# Patient Record
Sex: FEMALE | Race: BLACK OR AFRICAN AMERICAN | NOT HISPANIC OR LATINO | URBAN - NONMETROPOLITAN AREA
[De-identification: names, ages, dates, MRNs, and addresses within clinical notes are randomized per-mention and may not be internally consistent; named-entity substitution may affect disease eponyms.]

---

## 2021-03-30 ENCOUNTER — IMPORTED ENCOUNTER (OUTPATIENT)
Dept: URBAN - NONMETROPOLITAN AREA CLINIC 1 | Facility: CLINIC | Age: 76
End: 2021-03-30

## 2021-03-30 PROBLEM — H25.13: Noted: 2021-03-30

## 2021-03-30 PROBLEM — H40.013: Noted: 2021-03-30

## 2021-03-30 PROCEDURE — 92004 COMPRE OPH EXAM NEW PT 1/>: CPT

## 2021-03-30 PROCEDURE — 92015 DETERMINE REFRACTIVE STATE: CPT

## 2021-03-30 NOTE — PATIENT DISCUSSION
Cataract OU-Not yet surgical. -Reviewed symptoms of advancing cataract growth such as glare and halos and decreased vision.-Continue to monitor for now. Pt will notify us if any new symptoms develop. Glaucoma Suspect-Based on iop-Appears stable at this time.-Continue to monitor with exams and testing.

## 2021-08-17 NOTE — PATIENT DISCUSSION
"PT W WIFE AS INDEPENDENT HISTORIAN. DISCUSSED AT [facility] AT [facility] THE DX, IMAGES, PROGNOSIS AND NEED FOR F/U. RISK OF VL"" LOW. ALL QEUSTIOSN WERE ANSWERED."

## 2021-09-22 NOTE — PATIENT DISCUSSION
"DISCUSSED AT [address] DX, IMAGES, PROGNOSIS AND NEED FOR F/U. RISK OF VL"" LOW. ALL QEUSTIOSN WERE ANSWERED."

## 2021-09-22 NOTE — PATIENT DISCUSSION
9/22/21: No NEW retinal tears or retinal detachment seen on clinical exam today. Reviewed the signs and symptoms of retinal tear/retinal detachment and the importance of calling for prompt evaluation should there be increasing floaters, new flashing lights, or decreasing peripheral vision in either eye at any time. Observation recommended.

## 2022-04-15 ASSESSMENT — VISUAL ACUITY
OD_SC: 20/30
OS_CC: J2
OD_CC: J2
OS_SC: 20/50

## 2022-04-15 ASSESSMENT — TONOMETRY
OS_IOP_MMHG: 21
OD_IOP_MMHG: 21

## 2022-10-10 NOTE — PATIENT DISCUSSION
10/10/22: No NEW retinal tears or retinal detachment seen on clinical exam today. Reviewed the signs and symptoms of retinal tear/retinal detachment and the importance of calling for prompt evaluation should there be increasing floaters, new flashing lights, or decreasing peripheral vision in either eye at any time. Observation recommended.

## 2022-10-10 NOTE — PATIENT DISCUSSION
"DISCUSSED AT [address] DX, IMAGES, PROGNOSIS AND NEED FOR F/U. RISK OF VL"" LOW. ALL QEUSTIOSN WERE ANSWERED."

## 2022-10-10 NOTE — PATIENT DISCUSSION
10/10/22: SLIGHT PROGRESSION COMPARED TO 2021. EARLY/IMPENDING DEVELOPMENT OF LMH. TO CONT TO MONITOR. ERM does NOT APPEAR VISUALLY SIGNIFICANT.